# Patient Record
Sex: MALE | Race: WHITE | NOT HISPANIC OR LATINO | Employment: FULL TIME | URBAN - METROPOLITAN AREA
[De-identification: names, ages, dates, MRNs, and addresses within clinical notes are randomized per-mention and may not be internally consistent; named-entity substitution may affect disease eponyms.]

---

## 2021-03-08 ENCOUNTER — IMMUNIZATION (OUTPATIENT)
Dept: NURSING | Facility: CLINIC | Age: 31
End: 2021-03-08
Payer: COMMERCIAL

## 2021-03-08 PROCEDURE — 91303 PR COVID VAC JANSSEN AD26 0.5ML: CPT

## 2021-03-08 PROCEDURE — 0031A PR COVID VAC JANSSEN AD26 0.5ML: CPT

## 2021-04-11 ENCOUNTER — HEALTH MAINTENANCE LETTER (OUTPATIENT)
Age: 31
End: 2021-04-11

## 2021-09-26 ENCOUNTER — HEALTH MAINTENANCE LETTER (OUTPATIENT)
Age: 31
End: 2021-09-26

## 2021-11-10 ENCOUNTER — IMMUNIZATION (OUTPATIENT)
Dept: NURSING | Facility: CLINIC | Age: 31
End: 2021-11-10
Payer: COMMERCIAL

## 2021-11-10 PROCEDURE — 0064A COVID-19,PF,MODERNA (18+ YRS BOOSTER .25ML): CPT

## 2021-11-10 PROCEDURE — 91306 COVID-19,PF,MODERNA (18+ YRS BOOSTER .25ML): CPT

## 2022-02-19 ENCOUNTER — E-VISIT (OUTPATIENT)
Dept: URGENT CARE | Facility: URGENT CARE | Age: 32
End: 2022-02-19
Payer: COMMERCIAL

## 2022-02-19 ENCOUNTER — NURSE TRIAGE (OUTPATIENT)
Dept: NURSING | Facility: CLINIC | Age: 32
End: 2022-02-19

## 2022-02-19 DIAGNOSIS — N39.0 ACUTE UTI (URINARY TRACT INFECTION): Primary | ICD-10-CM

## 2022-02-19 DIAGNOSIS — N39.0 ACUTE UTI (URINARY TRACT INFECTION): ICD-10-CM

## 2022-02-19 PROCEDURE — 99421 OL DIG E/M SVC 5-10 MIN: CPT | Performed by: NURSE PRACTITIONER

## 2022-02-19 RX ORDER — NITROFURANTOIN 25; 75 MG/1; MG/1
100 CAPSULE ORAL 2 TIMES DAILY
Qty: 10 CAPSULE | Refills: 0 | Status: SHIPPED | OUTPATIENT
Start: 2022-02-19 | End: 2022-02-19

## 2022-02-19 RX ORDER — NITROFURANTOIN 25; 75 MG/1; MG/1
100 CAPSULE ORAL 2 TIMES DAILY
Qty: 10 CAPSULE | Refills: 0 | Status: SHIPPED | OUTPATIENT
Start: 2022-02-19 | End: 2022-12-18

## 2022-02-20 NOTE — PATIENT INSTRUCTIONS
Dear Frankie Chandler    After reviewing your responses, I've been able to diagnose you with a urinary tract infection, which is a common infection of the bladder with bacteria.  This is not a sexually transmitted infection, though urinating immediately after intercourse can help prevent infections.  Drinking lots of fluids is also helpful to clear your current infection and prevent the next one.      I have sent a prescription for antibiotics to your pharmacy to treat this infection.    It is important that you take all of your prescribed medication even if your symptoms are improving after a few doses.  Taking all of your medicine helps prevent the symptoms from returning.     If your symptoms worsen, you develop pain in your back or stomach, develop fevers, or are not improving in 5 days, please contact your primary care provider for an appointment or visit any of our convenient Walk-in or Urgent Care Centers to be seen, which can be found on our website here.    Thanks again for choosing us as your health care partner,    Radha Harris CNP    Understanding Urinary Tract Infections (UTIs)   Most UTIs are caused by bacteria, but they may also be caused by viruses or fungi. Bacteria from the bowel are the most common source of infection. The infection may start because of any of the following:     Sexual activity.  During sex, bacteria can travel from the penis, vagina, or rectum into the urethra.     Bacteria outside the rectum getting into the urethra.  Bacteria on the skin outside the rectum may travel into the urethra. This is more common in women since the rectum and urethra are closer to each other than in men. Wiping from front to back after using the toilet and keeping the area clean can help prevent germs from getting to the urethra.    Blocked urine flow through the urinary tract. If urine sits too long, germs may start to grow out of control.  Parts of the urinary tract  The infection can occur in any  part of the urinary tract.       The kidneys. These organs collect and store urine.    The ureters. These tubes carry urine from the kidneys to the bladder.    The bladder. This holds urine until you are ready to let it out.    The urethra. This tube carries urine from the bladder out of the body. It is shorter in women, so bacteria can move through it more easily. The urethra is longer in men, so a UTI is less likely to reach the bladder or kidneys in men.  Cue last reviewed this educational content on 1/1/2020 2000-2021 [employer]. All rights reserved. This information is not intended as a substitute for professional medical care. Always follow your healthcare professional's instructions.

## 2022-02-20 NOTE — TELEPHONE ENCOUNTER
PCP: [facility]  Patient states he had an  E visit 1 hr ago and a RX antibiotic was sent to his pharmacy which is closed until Monday: [facility]. They are requesting it to be sent to:  [facility] [address] [facility] [address]   This was done @ 8:19pm--they will  RX tonight.   ([place] order cancelled).     Renata Carmona RN Triage Nurse Advisor 8:23 PM 2/19/2022      Reason for Disposition    [1] Prescription prescribed recently is not at pharmacy AND [2] triager has access to patient's EMR AND [3] prescription is recorded in the EMR    Additional Information    Negative: Drug overdose and triager unable to answer question    Negative: Caller requesting information unrelated to medicine    Negative: Caller requesting a prescription for Strep throat and has a positive culture result    Negative: Rash while taking a medication or within 3 days of stopping it    Negative: Immunization reaction suspected    Negative: [1] Asthma and [2] having symptoms of asthma (cough, wheezing, etc.)    Negative: [1] Influenza symptoms AND [2] anti-viral med prescription request, such as Tamiflu    Negative: [1] Symptom of illness (e.g., headache, abdominal pain, earache, vomiting) AND [2] more than mild    Negative: MORE THAN A DOUBLE DOSE of a prescription or over-the-counter (OTC) drug    Negative: [1] DOUBLE DOSE (an extra dose or lesser amount) of over-the-counter (OTC) drug AND [2] any symptoms (e.g., dizziness, nausea, pain, sleepiness)    Negative: [1] DOUBLE DOSE (an extra dose or lesser amount) of prescription drug AND [2] any symptoms (e.g., dizziness, nausea, pain, sleepiness)    Negative: Took another person's prescription drug    Negative: [1] DOUBLE DOSE (an extra dose or lesser amount) of prescription drug AND [2] NO symptoms (Exception: a double dose of antibiotics)    Negative: Diabetes drug error or overdose (e.g., took wrong type of insulin or took extra dose)    Negative:  "[1] Request for URGENT new prescription or refill of \"essential\" medication (i.e., likelihood of harm to patient if not taken) AND [2] triager unable to fill per unit policy    Negative: [1] Prescription not at pharmacy AND [2] was prescribed by PCP recently    Negative: [1] Pharmacy calling with prescription questions AND [2] triager unable to answer question    Negative: [1] Caller has URGENT medication question about med that PCP or specialist prescribed AND [2] triager unable to answer question    Negative: [1] Caller has NON-URGENT medication question about med that PCP prescribed AND [2] triager unable to answer question    Negative: [1] Caller requesting a NON-URGENT new prescription or refill AND [2] triager unable to refill per unit policy    Negative: [1] Caller has medication question about med not prescribed by PCP AND [2] triager unable to answer question (e.g., compatibility with other med, storage)    Negative: Caller requesting a CONTROLLED substance prescription refill (e.g., narcotics, ADHD medicines)    Negative: Caller wants to use a complementary or alternative medicine    Protocols used: MEDICATION QUESTION CALL-A-  COVID 19 Nurse Triage Plan/Patient Instructions    Please be aware that novel coronavirus (COVID-19) may be circulating in the community. If you develop symptoms such as fever, cough, or SOB or if you have concerns about the presence of another infection including coronavirus (COVID-19), please contact your health care provider or visit [email or web address].     Disposition/Instructions    Home care recommended. Follow home care protocol based instructions.    Thank you for taking steps to prevent the spread of this virus.  o Limit your contact with others.  o Wear a simple mask to cover your cough.  o Wash your hands well and often.    Resources    [facility]: About COVID-19: [email or web address]    CDC: What to Do If You're Sick: " www.cdc.gov/coronavirus/2019-ncov/about/steps-when-sick.html    CDC: Ending Home Isolation: www.cdc.gov/coronavirus/2019-ncov/hcp/disposition-in-home-patients.html     CDC: Caring for Someone: www.cdc.gov/coronavirus/2019-ncov/if-you-are-sick/care-for-someone.html     [facility]: Interim Guidance for Hospital Discharge to Home: www.[facility].mn./diseases/coronavirus/hcp/hospdischarge.pdf    [facility] clinical trials (COVID-19 research studies): [email or web address].[email or web address]-clinical-trials     Below are the COVID-19 hotlines at the [place] Department of [facility] ([facility]). Interpreters are available.   o For health questions: Call 736-940-7031 or 1-182.550.1329 (7 a.m. to 7 p.m.)  o For questions about schools and childcare: Call 619-039-3958 or 1-255.875.1327 (7 a.m. to 7 p.m.)

## 2022-05-07 ENCOUNTER — HEALTH MAINTENANCE LETTER (OUTPATIENT)
Age: 32
End: 2022-05-07

## 2022-07-07 ENCOUNTER — OFFICE VISIT (OUTPATIENT)
Dept: DERMATOLOGY | Facility: CLINIC | Age: 32
End: 2022-07-07
Payer: COMMERCIAL

## 2022-07-07 DIAGNOSIS — L82.1 SEBORRHEIC KERATOSIS: ICD-10-CM

## 2022-07-07 DIAGNOSIS — D18.01 CHERRY ANGIOMA: Primary | ICD-10-CM

## 2022-07-07 DIAGNOSIS — D22.9 MULTIPLE BENIGN NEVI: ICD-10-CM

## 2022-07-07 DIAGNOSIS — Z80.8 FAMILY HISTORY OF MELANOMA: ICD-10-CM

## 2022-07-07 DIAGNOSIS — L91.8 ACROCHORDON: ICD-10-CM

## 2022-07-07 PROCEDURE — 99203 OFFICE O/P NEW LOW 30 MIN: CPT | Performed by: DERMATOLOGY

## 2022-07-07 RX ORDER — CETIRIZINE HYDROCHLORIDE 10 MG/1
10 TABLET ORAL DAILY
COMMUNITY

## 2022-07-07 ASSESSMENT — PAIN SCALES - GENERAL: PAINLEVEL: NO PAIN (0)

## 2022-07-07 NOTE — PATIENT INSTRUCTIONS
We recommend yearly skin checks with the family history of melanoma and photosensitive skin.    Heliocare pill for daily skin protection when you know you will be outside.

## 2022-07-07 NOTE — PROGRESS NOTES
[facility] Dermatology Note  Encounter Date: Jul 7, 2022  Office Visit     Dermatology Problem List:  1. Family hx of melanoma  ____________________________________________    Assessment & Plan:    # Family history of melanoma  Given family history of melanoma and photosensitive skin, we recommend yearly skin checks.  - Sun precaution was advised including the use of sun screens of SPF 30 or higher, sun protective clothing, and avoidance of tanning beds.  - Recommended Heliocare daily use when you know you will be outside    # Benign lesions: Multiple benign nevi, seborrheic keratoses, cherry angiomas, acrochordon.   Explained to patient benign nature of lesion. No treatment is necessary at this time unless the lesion changes or becomes symptomatic.   - ABCDs of melanoma were discussed and self skin checks were advised.  - Sun precaution was advised including the use of sun screens of SPF 30 or higher, sun protective clothing, and avoidance of tanning beds.    Procedures Performed:   None    Follow-up: 1 year in-person, or earlier for new or changing lesions    Staff, Medical Student, and Scribe:     Raymond Ashford, MS3  [facility]    Scribe Disclosure:  I, Beth Watson, am serving as a scribe to document services personally performed by Arben Adkins MD based on data collection and the provider's statements to me.     Provider Disclosure:   The documentation recorded by the scribe accurately reflects the services I personally performed and the decisions made by me.    Staff Physician:  I was present with the medical student who participated in the service and in the documentation of the note. I have verified the history and personally performed the physical exam and medical decision making. I agree with the assessment and plan of care as documented in the note. The procedure(s) was(were) performed by myself.    Arben Adkins MD  Pronouns:  he/him/his    Department of Dermatology  [facility]: Phone: [phone], Fax:[phone]  [facility]: Phone: [phone] Fax: [phone]  ____________________________________________    CC: Skin Check (Family history of skin cancer, dad recently diagnosed with skin cancer, spots on face )    HPI:  Mr. Frankie Chandler is a(n) 32 year old male who presents today as a new patient for skin check in the context of family history of melanoma.    Frankie presents to clinic today for a skin exam given father with recent melanoma diagnosis. He has never been seen by a dermatologist and does not report any abnormal moles removed. He does not report any new or changing skin lesions and does not report any concerning lesions.    He has been very diligent with sun protection for the last 5-10 years, but rarely used sun screen as a kid and endorses blistering sun burns through childhood. He denies any tanning bed use.    Patient is otherwise feeling well, without additional skin concerns.    Family Hx:  Father with melanoma    No other family members with history of skin cancer.    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 Outdoor activities include golfing and fishing. No current or historical tobacco use.       Labs Reviewed:  N/A    Physical Exam:  Vitals: There were no vitals taken for this visit.  SKIN: Full skin, which includes the head/face, both arms, chest, back, abdomen,both legs, genitalia and/or groin buttocks, digits and/or nails, was examined.  - There are dome shaped bright red papules on the trunk.   - There are white 1-2 mm papules on the R nasal alar crease.   - Multiple regular brown pigmented macules and papules are identified on the face, trunk and  extremities.   - There are skin colored pedunculated papules on the neck and trunk.   - There are waxy stuck on tan to brown papules on the trunk..   - No other lesions of concern on areas examined.     Medications:  Current Outpatient Medications   Medication     cetirizine (ZYRTEC) 10 MG tablet     FLUTICASONE FUROATE NA     albuterol (PROAIR HFA, PROVENTIL HFA, VENTOLIN HFA) 108 (90 BASE) MCG/ACT inhaler     Atorvastatin Calcium (LIPITOR PO)     Fexofenadine HCl (ALLEGRA PO)     fluticasone (FLOVENT HFA) 110 MCG/ACT inhaler     LISINOPRIL PO     nitroFURantoin macrocrystal-monohydrate (MACROBID) 100 MG capsule     No current facility-administered medications for this visit.      Past Medical History:   Patient Active Problem List   Diagnosis     Hypertension     Hyperlipidemia     Asthma     Environmental allergies     ELISABETH (obstructive sleep apnea)     History reviewed. No pertinent past medical history.

## 2022-07-07 NOTE — NURSING NOTE
Dermatology Rooming Note    Frankie Chandler's goals for this visit include:   Chief Complaint   Patient presents with     Skin Check     Family history of skin cancer, dad recently diagnosed with skin cancer, spots on face      Kavita King, Visit Facilitator

## 2022-07-07 NOTE — LETTER
7/7/2022       RE: Frankie Chandler  [address]  [place] [address]     Dear Colleague,    Thank you for referring your patient, Frankie Chandler, to the [facility] [place] [facility]. Please see a copy of my visit note below.    [facility] Dermatology Note  Encounter Date: Jul 7, 2022  Office Visit     Dermatology Problem List:  1. Family hx of melanoma  ____________________________________________    Assessment & Plan:    # Family history of melanoma  Given family history of melanoma and photosensitive skin, we recommend yearly skin checks.  - Sun precaution was advised including the use of sun screens of SPF 30 or higher, sun protective clothing, and avoidance of tanning beds.  - Recommended Heliocare daily use when you know you will be outside    # Benign lesions: Multiple benign nevi, seborrheic keratoses, cherry angiomas, acrochordon.   Explained to patient benign nature of lesion. No treatment is necessary at this time unless the lesion changes or becomes symptomatic.   - ABCDs of melanoma were discussed and self skin checks were advised.  - Sun precaution was advised including the use of sun screens of SPF 30 or higher, sun protective clothing, and avoidance of tanning beds.    Procedures Performed:   None    Follow-up: 1 year in-person, or earlier for new or changing lesions    Staff, Medical Student, and Scribe:     Raymond Ashford, MS3  [employer]    Scribe Disclosure:  I, Beth Watson, am serving as a scribe to document services personally performed by Arben Adkins MD based on data collection and the provider's statements to me.     Provider Disclosure:   The documentation recorded by the scribe accurately reflects the services I personally performed and the decisions made by me.    Staff Physician:  I was present with the medical student who participated in the  service and in the documentation of the note. I have verified the history and personally performed the physical exam and medical decision making. I agree with the assessment and plan of care as documented in the note. The procedure(s) was(were) performed by myself.    Arben Adkins MD  Pronouns: he/him/his    Department of Dermatology  [facility]: Phone: [phone], Fax:[phone]  [facility]: Phone: [phone] Fax: [phone]  ____________________________________________    CC: Skin Check (Family history of skin cancer, dad recently diagnosed with skin cancer, spots on face )    HPI:  Mr. Frankie Chandler is a(n) 32 year old male who presents today as a new patient for skin check in the context of family history of melanoma.    Frankie presents to clinic today for a skin exam given father with recent melanoma diagnosis. He has never been seen by a dermatologist and does not report any abnormal moles removed. He does not report any new or changing skin lesions and does not report any concerning lesions.    He has been very diligent with sun protection for the last 5-10 years, but rarely used sun screen as a kid and endorses blistering sun burns through childhood. He denies any tanning bed use.    Patient is otherwise feeling well, without additional skin concerns.    Family Hx:  Father with melanoma    No other family members with history of skin cancer.    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 Outdoor activities include golfing and fishing. No current or historical tobacco use.       Labs Reviewed:  N/A    Physical Exam:  Vitals: There were no vitals taken for this visit.  SKIN: Full skin, which includes the head/face, both arms, chest, back,  abdomen,both legs, genitalia and/or groin buttocks, digits and/or nails, was examined.  - There are dome shaped bright red papules on the trunk.   - There are white 1-2 mm papules on the R nasal alar crease.   - Multiple regular brown pigmented macules and papules are identified on the face, trunk and extremities.   - There are skin colored pedunculated papules on the neck and trunk.   - There are waxy stuck on tan to brown papules on the trunk..   - No other lesions of concern on areas examined.     Medications:  Current Outpatient Medications   Medication     cetirizine (ZYRTEC) 10 MG tablet     FLUTICASONE FUROATE NA     albuterol (PROAIR HFA, PROVENTIL HFA, VENTOLIN HFA) 108 (90 BASE) MCG/ACT inhaler     Atorvastatin Calcium (LIPITOR PO)     Fexofenadine HCl (ALLEGRA PO)     fluticasone (FLOVENT HFA) 110 MCG/ACT inhaler     LISINOPRIL PO     nitroFURantoin macrocrystal-monohydrate (MACROBID) 100 MG capsule     No current facility-administered medications for this visit.      Past Medical History:   Patient Active Problem List   Diagnosis     Hypertension     Hyperlipidemia     Asthma     Environmental allergies     ELISABETH (obstructive sleep apnea)     History reviewed. No pertinent past medical history.

## 2022-12-18 ENCOUNTER — OFFICE VISIT (OUTPATIENT)
Dept: URGENT CARE | Facility: URGENT CARE | Age: 32
End: 2022-12-18
Payer: COMMERCIAL

## 2022-12-18 VITALS
SYSTOLIC BLOOD PRESSURE: 140 MMHG | HEART RATE: 73 BPM | DIASTOLIC BLOOD PRESSURE: 88 MMHG | TEMPERATURE: 98.1 F | RESPIRATION RATE: 18 BRPM | OXYGEN SATURATION: 98 %

## 2022-12-18 DIAGNOSIS — J02.9 SORE THROAT: ICD-10-CM

## 2022-12-18 DIAGNOSIS — R68.89 FLU-LIKE SYMPTOMS: Primary | ICD-10-CM

## 2022-12-18 LAB
DEPRECATED S PYO AG THROAT QL EIA: NEGATIVE
GROUP A STREP BY PCR: NOT DETECTED

## 2022-12-18 PROCEDURE — 99213 OFFICE O/P EST LOW 20 MIN: CPT | Performed by: PHYSICIAN ASSISTANT

## 2022-12-18 PROCEDURE — 87651 STREP A DNA AMP PROBE: CPT | Performed by: PHYSICIAN ASSISTANT

## 2022-12-18 ASSESSMENT — ENCOUNTER SYMPTOMS
WHEEZING: 0
RHINORRHEA: 1
VOMITING: 0
VOICE CHANGE: 1
ABDOMINAL PAIN: 0
FEVER: 1
DIARRHEA: 0
COUGH: 1
SORE THROAT: 1

## 2022-12-18 NOTE — PROGRESS NOTES
Assessment & Plan:        ICD-10-CM    1. Flu-like symptoms  R68.89       2. Sore throat  J02.9 Streptococcus A Rapid Screen w/Reflex to PCR - Clinic Collect     Group A Streptococcus PCR Throat Swab            Plan/Clinical Decision Making:    Patient with suspected influenza. Strep test negative. Declined flu and covid testing today. Shared medical decision.  Discussed rest, fluids, OTC cold medications as needed. Ibuprofen or Tylenol as needed. Gargle, warm tea for throat.   Discussed no sign of bacterial infection at this time, normal lung, ear exam. Discussed we avoid antibiotics for sinus symptoms since usually viral unless worsening after 10 to 14 days.         Return if symptoms worsen or fail to improve, for in 5-7 days.     At the end of the encounter, I discussed results, diagnosis, medications. Discussed red flags for immediate return to clinic/ER, as well as indications for follow up if no improvement. Patient understood and agreed to plan. Patient was stable for discharge.        Sophie Pearson PA-C on 12/18/2022 at 9:53 AM          Subjective:     HPI:    Frankie is a 32 year old male who presents to clinic today for the following health issues:  Chief Complaint   Patient presents with     Pharyngitis     Pt here today presenting with sx of voice loss, ST, fever, ear pressure, brown and green sinus discharge X 5 days.      HPI    Patient complains of illness x 5 days.   Did recently travel.   Off and on fevers, Bad ST, ear pressure,   No known exposure to anything.   Flu is going around work, had flu shot and wears mask.   Negative home covid test.     Elevated BP, usually normal per patient.     History obtained from the patient.    Review of Systems   Constitutional: Positive for fever.   HENT: Positive for congestion, ear pain, rhinorrhea, sore throat and voice change.    Respiratory: Positive for cough. Negative for wheezing.    Gastrointestinal: Negative for abdominal pain, diarrhea and  vomiting.         Patient Active Problem List   Diagnosis     Hypertension     Hyperlipidemia     Asthma     Environmental allergies     ELISABETH (obstructive sleep apnea)        No past medical history on file.    Social History     Tobacco Use     Smoking status: Former     Types: Cigarettes     Smokeless tobacco: Never   Substance Use Topics     Alcohol use: Not on file             Objective:     Vitals:    12/18/22 0916   BP: (!) 140/88   BP Location: Right arm   Patient Position: Sitting   Cuff Size: Adult Large   Pulse: 73   Resp: 18   Temp: 98.1  F (36.7  C)   TempSrc: Tympanic   SpO2: 98%         Physical Exam   EXAM:  Pleasant, alert, appropriate appearance. NAD.  Head Exam: Normocephalic, atraumatic.  Eye Exam:   non icteric/injection.    Ear Exam: TMs grey without bulging. Normal canals.  Normal pinna.  Nose Exam: Normal external nose.    OroPharynx Exam:  Moist mucous membranes. Mild erythema, pharynx without exudate or hypertrophy.  Neck/Thyroid Exam:  No LAD.    Chest/Respiratory Exam: CTAB.  Cardiovascular Exam: RRR. No murmur or rubs.      Results:  Results for orders placed or performed in visit on 12/18/22   Streptococcus A Rapid Screen w/Reflex to PCR - Clinic Collect     Status: Normal    Specimen: Throat; Swab   Result Value Ref Range    Group A Strep antigen Negative Negative

## 2023-03-17 ENCOUNTER — LAB (OUTPATIENT)
Dept: LAB | Facility: CLINIC | Age: 33
End: 2023-03-17
Payer: COMMERCIAL

## 2023-03-17 DIAGNOSIS — O28.3 ABNORMAL FETAL ULTRASOUND: ICD-10-CM

## 2023-03-17 DIAGNOSIS — Z31.440 ENCOUNTER OF MALE FOR TESTING FOR GENETIC DISEASE CARRIER STATUS FOR PROCREATIVE MANAGEMENT: ICD-10-CM

## 2023-03-17 DIAGNOSIS — Z31.440 ENCOUNTER OF MALE FOR TESTING FOR GENETIC DISEASE CARRIER STATUS FOR PROCREATIVE MANAGEMENT: Primary | ICD-10-CM

## 2023-03-17 PROCEDURE — 36415 COLL VENOUS BLD VENIPUNCTURE: CPT

## 2023-03-17 NOTE — PROGRESS NOTES
[facility]  Genetic Counseling Consult    Patient:  Frankie Chandler YOB: 1990   Date of Service:  23      Frankie accompanied his partner, Irina Chandler, to her appointment at the [facility] for genetic consultation. The option to pursue carrier screening was discussed today.        Impression/Plan:   1. Frankie and his partner, Irina elected to pursue expanded carrier screening for 558 conditions through [employer]. Results are expected within 2-3 weeks, and will be available in [employer].  We will contact the couple to discuss the results. The couple requested that we contact Irina with results once both are available. She provided verbal permission for results to be left in her voicemail. Authorization to share protected health information was signed today's visit.    Carrier Screening:   The patient reports that she and the father of the pregnancy have  ancestry:     Cystic fibrosis is an autosomal recessive genetic condition that occurs with increased frequency in individuals of  ancestry and carrier screening for this condition is available.  In addition,  screening in the [facility] includes cystic fibrosis.    Expanded carrier screening for mutations in a large panel of genes associated with autosomal recessive conditions including cystic fibrosis, spinal muscular atrophy, and others, is now available.    We discussed that expanded carrier screening is designed to identify carrier status for conditions that are primarily childhood or adolescent onset. Expanded carrier screening does not evaluate for adult-onset conditions such as hereditary cancer syndromes, dementia/ Alzheimer's disease, or cardiovascular disease risk factors. Additionally, expanded carrier screening is not comprehensive for all known genetic diseases or inherited conditions. This is a screening test, and residual  carrier status risk figures will be provided to the patient after results become available. Carrier screening is not meant to diagnose the patient with a condition, and generally carriers are asymptomatic. However, certain genes may confer increased risks for various health concerns in carriers (DMD, FMR1, etc).  Patients are encouraged to share results with their primary care providers to ensure appropriate screening. If we are notified by the performing laboratory of a variant reclassification, the patient will be contacted.     We reviewed availability of expanded carrier screening through through [employer]. [employer] will report on pseudodeficiency alleles, which are benign variants that are not known to be associated with disease and are not thought to impact the individuals risk to be a carrier for these conditions. However, the presence of pseudodeficiency alleles can exhibit false positive results on biochemical tests such as  screen. [employer] will report the common 5T CFTR allele in isolation.     We briefly reviewed a couple of conditions associated with polydactyly that are on comprehensive carrier screening, including Valdes Van Creveld Syndrome and Bardet Biedl Syndrome.    Frankie and his partner, Irina elected to pursue expanded carrier screening for 558 conditions through [employer]. Results are expected within 2-3 weeks, and will be available in [employer].  We will contact the couple to discuss the results. The couple requested that we contact Irina with results once both are available.     We reviewed the benefits and limitations of this testing.  Screening tests provide a risk assessment specific to the pregnancy for certain fetal chromosome abnormalities, but cannot definitively diagnose or exclude a fetal chromosome abnormality.  Follow-up genetic counseling and consideration of diagnostic testing is recommended with any abnormal screening result.      Diagnostic tests carry inherent risks- including risk of miscarriage- that require careful consideration.  These tests can detect fetal chromosome abnormalities with greater than 99% certainty.  Results can be compromised by maternal cell contamination or mosaicism, and are limited by the resolution of cytogenetic G-banding technology.  There is no screening nor diagnostic test that can detect all forms of birth defects or mental disability.    It was a pleasure to be involved with Frankie's care.     Naz Coker MS, [facility]  Licensed Genetic Counselor  [facility]  Pager: [phone]  Office: [phone]

## 2023-03-23 NOTE — TELEPHONE ENCOUNTER
Provider E-Visit time total (minutes): 2   well with these but gets fatigued quickly. Also challenged with balance beam walking as it is similar to walking along pipes at times. Has much difficulty with backwards walking as he poorly integrates sensory cues and trunk control. Also moves too quickly which impacts balance adjustments. Overall pt tolerates session well with appropriate effort. [] No change. [] Other:    [x] Patient would continue to benefit from skilled physical therapy services in order to: increase balance, improve vestibular system function, improve gait, and work on higher level mobility to allow him to continue to be active with his kids and potentially return to work. GOALS:   STG: (to be met in 8 treatments)  Pt will be able to navigate curbs, uneven surfaces, and inclines/declines IND  to ensure he can safely navigate the community & get outdoors more. Pt will increase gait speed to >/=  1.2 m/s IND to improve ability to navigate as a community ambulator & cross streets. Pt will score above 24/30 on functional gait assessment to decrease overall fall risk with community level mobility &  demonstrate improved functional mobility   Pt will improve time on 4 square step test to < 12 seconds to demonstrate improved coordination and change of directions to be able to navigate small spaces. Pt will improve balance on foam with vision limited to >15 seconds to demonstrate improved vestibular system function to decrease fall risk. LTG: (to be met in 16 treatments)  Pt will increase score on LEFI to >65/80 to demonstrate meaningful change in functional mobility to be able to increased mobility. Pt will be able to complete a motor task of balance task with a UE reaction time to <0.75s to demonstrate improved reaction time to be able to progress to driving. Pt will be able to coordinate UE & LE reciprocally to be able to climb a ladder to allow him to return to work.    Pt will be independent with home program addressing

## 2023-04-06 LAB — SCANNED LAB RESULT: ABNORMAL

## 2023-04-10 ENCOUNTER — TELEPHONE (OUTPATIENT)
Dept: MATERNAL FETAL MEDICINE | Facility: CLINIC | Age: 33
End: 2023-04-10
Payer: COMMERCIAL

## 2023-04-10 NOTE — TELEPHONE ENCOUNTER
April 10, 2023     I left a message for Irina in which I reviewed the results of her and her partner, Frankie's, carrier screening for the [employer] comprehensive carrier screening which assessed 558 genes. The couple was not found to be carriers for any of the same conditions. They were encouraged to share results with family members for their own consideration of carrier screening. 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 He is not expected to have symptoms of this condition. See Irina's chart for her results. Irina had an ultrasound last Friday, 4/7/23. I briefly discussed Frankie's carrier screening results with the couple at that time.     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 This means they can impact bone growth, the immune system, the gastrointestinal system, and the circulatory system.   - The presentation of these disorders is variable and can range from disorders of the bones to life-threatening complications. This disorder can be associated with anemia, Hirschprung disease (difficulty passing stools), and severe combined immunodeficiency (SCID).  - Since Irina was NOT identified to be a carrier of this condition, the residual risk is 1 in 199,600.     Results are available in [employer] for review.     Naz Coker MS, [facility]  Licensed Genetic Counselor  [facility]  Pager: [phone]  Office: [phone]

## 2023-04-22 ENCOUNTER — HEALTH MAINTENANCE LETTER (OUTPATIENT)
Age: 33
End: 2023-04-22

## 2023-06-02 ENCOUNTER — HEALTH MAINTENANCE LETTER (OUTPATIENT)
Age: 33
End: 2023-06-02

## 2024-06-29 ENCOUNTER — HEALTH MAINTENANCE LETTER (OUTPATIENT)
Age: 34
End: 2024-06-29

## 2025-07-13 ENCOUNTER — HEALTH MAINTENANCE LETTER (OUTPATIENT)
Age: 35
End: 2025-07-13

## 2025-07-23 ENCOUNTER — TRANSCRIBE ORDERS (OUTPATIENT)
Dept: OTHER | Age: 35
End: 2025-07-23

## 2025-07-23 ENCOUNTER — MEDICAL CORRESPONDENCE (OUTPATIENT)
Dept: HEALTH INFORMATION MANAGEMENT | Facility: CLINIC | Age: 35
End: 2025-07-23
Payer: COMMERCIAL

## 2025-07-23 DIAGNOSIS — Z12.11 SCREEN FOR COLON CANCER: Primary | ICD-10-CM
